# Patient Record
Sex: FEMALE | Race: WHITE | NOT HISPANIC OR LATINO | URBAN - METROPOLITAN AREA
[De-identification: names, ages, dates, MRNs, and addresses within clinical notes are randomized per-mention and may not be internally consistent; named-entity substitution may affect disease eponyms.]

---

## 2018-07-10 ENCOUNTER — OFFICE (OUTPATIENT)
Dept: URBAN - METROPOLITAN AREA CLINIC 19 | Facility: CLINIC | Age: 63
End: 2018-07-10

## 2018-07-10 VITALS
SYSTOLIC BLOOD PRESSURE: 124 MMHG | DIASTOLIC BLOOD PRESSURE: 69 MMHG | HEIGHT: 66 IN | HEART RATE: 74 BPM | WEIGHT: 206 LBS

## 2018-07-10 DIAGNOSIS — K21.9 GASTRO-ESOPHAGEAL REFLUX DISEASE WITHOUT ESOPHAGITIS: ICD-10-CM

## 2018-07-10 DIAGNOSIS — K51.90 ULCERATIVE COLITIS, UNSPECIFIED, WITHOUT COMPLICATIONS: ICD-10-CM

## 2018-07-10 DIAGNOSIS — R19.7 DIARRHEA, UNSPECIFIED: ICD-10-CM

## 2018-07-10 PROCEDURE — 99213 OFFICE O/P EST LOW 20 MIN: CPT | Performed by: INTERNAL MEDICINE

## 2018-07-10 RX ORDER — MESALAMINE 1000 MG/1
1 SUPPOSITORY RECTAL
Qty: 90 | Refills: 3 | Status: COMPLETED
Start: 2018-07-10 | End: 2019-07-15

## 2018-07-10 RX ORDER — PANTOPRAZOLE SODIUM 20 MG/1
20 TABLET, DELAYED RELEASE ORAL
Qty: 30 | Refills: 11 | Status: COMPLETED
Start: 2018-07-10 | End: 2019-07-23 | Stop reason: SDUPTHER

## 2019-07-15 ENCOUNTER — OFFICE (OUTPATIENT)
Dept: URBAN - METROPOLITAN AREA CLINIC 19 | Facility: CLINIC | Age: 64
End: 2019-07-15

## 2019-07-15 VITALS
SYSTOLIC BLOOD PRESSURE: 133 MMHG | HEART RATE: 67 BPM | DIASTOLIC BLOOD PRESSURE: 72 MMHG | HEIGHT: 66 IN | WEIGHT: 203 LBS

## 2019-07-15 DIAGNOSIS — R19.7 DIARRHEA, UNSPECIFIED: ICD-10-CM

## 2019-07-15 DIAGNOSIS — K21.9 GASTRO-ESOPHAGEAL REFLUX DISEASE WITHOUT ESOPHAGITIS: ICD-10-CM

## 2019-07-15 DIAGNOSIS — K51.20 ULCERATIVE (CHRONIC) PROCTITIS WITHOUT COMPLICATIONS: ICD-10-CM

## 2019-07-15 DIAGNOSIS — R11.0 NAUSEA: ICD-10-CM

## 2019-07-15 PROCEDURE — 99213 OFFICE O/P EST LOW 20 MIN: CPT | Performed by: NURSE PRACTITIONER

## 2019-07-15 RX ORDER — MESALAMINE 1000 MG/1
SUPPOSITORY RECTAL
Qty: 30 | Refills: 3 | Status: ACTIVE
Start: 2019-07-15

## 2019-07-15 RX ORDER — LACTOBACIL 2/BIFIDO 1/S.THERMO 450B CELL
PACKET (EA) ORAL
Qty: 30 | Refills: 11 | Status: COMPLETED
Start: 2019-07-15 | End: 2019-07-26

## 2019-07-15 RX ORDER — PANTOPRAZOLE SODIUM 20 MG/1
TABLET, DELAYED RELEASE ORAL
Qty: 30 | Refills: 11 | Status: COMPLETED
Start: 2019-07-15 | End: 2020-03-01

## 2019-07-15 NOTE — SERVICENOTES
The patient's assessment was discussed face to face with Dr. Castellanos and a collaborative plan of care was established. May add EGD to flex/sig

## 2019-07-15 NOTE — SERVICEHPINOTES
Mrs. Chamberlain presents with diarrhea, fever, nausea, and for follow-up on her ulcerative colitis.  After returning home from a trip to [place] on the 4th of July, she began experiencing fever, chills, diarrhea, and nausea.  She had been eating foods that she does not normally eat, and she did also get a tick bite.  Blood work by Dr. Montaño showed a normal CBC and CMP.  Her CRP was slightly elevated at 2.2.  She has had ulcerative colitis for many years.  She has been taking canasa suppositories once a week.  With this acute illness, she denies any hematochezia, tenesmus, or vomiting.  The fevers have now subsided.  She still continues having diarrhea.  She is having 1 or 2 loose stools a day, but she is taking Imodium.  Prior to this acute episode, she reports having diarrhea fairly frequently for the last year and a half.  It is not associated with the dull abdominal pain with her flares of her UC in the past.  It happens if she eats foods such as salads, spicy foods, or many other foods.  She is having to stick to a bland diet. She denies any precipitating factors for when the diarrhea began happening more frequently. Her colonoscopy in 2016 showed ulcerative colitis in the sigmoid colon.  She also complains of reflux.  She is hesitant to be on the higher dose of PPIs.  She is currently taking pantoprazole 20 mg daily, but not on a regular basis.  She is worried about a hiatal hernia because her father has a large one.

## 2019-07-18 ENCOUNTER — OFFICE (OUTPATIENT)
Dept: URBAN - METROPOLITAN AREA CLINIC 19 | Facility: CLINIC | Age: 64
End: 2019-07-18

## 2019-07-18 LAB
GI PROFILE, STOOL, PCR: ADENOVIRUS F 40/41: NOT DETECTED
GI PROFILE, STOOL, PCR: ASTROVIRUS: NOT DETECTED
GI PROFILE, STOOL, PCR: C DIFFICILE TOXIN A/B: DETECTED
GI PROFILE, STOOL, PCR: CAMPYLOBACTER: NOT DETECTED
GI PROFILE, STOOL, PCR: CRYPTOSPORIDIUM: NOT DETECTED
GI PROFILE, STOOL, PCR: CYCLOSPORA CAYETANENSIS: NOT DETECTED
GI PROFILE, STOOL, PCR: E COLI O157: (no result)
GI PROFILE, STOOL, PCR: ENTAMOEBA HISTOLYTICA: NOT DETECTED
GI PROFILE, STOOL, PCR: ENTEROAGGREGATIVE E COLI: NOT DETECTED
GI PROFILE, STOOL, PCR: ENTEROPATHOGENIC E COLI: NOT DETECTED
GI PROFILE, STOOL, PCR: ENTEROTOXIGENIC E COLI: NOT DETECTED
GI PROFILE, STOOL, PCR: GIARDIA LAMBLIA: NOT DETECTED
GI PROFILE, STOOL, PCR: NOROVIRUS GI/GII: NOT DETECTED
GI PROFILE, STOOL, PCR: PLESIOMONAS SHIGELLOIDES: NOT DETECTED
GI PROFILE, STOOL, PCR: ROTAVIRUS A: NOT DETECTED
GI PROFILE, STOOL, PCR: SALMONELLA: NOT DETECTED
GI PROFILE, STOOL, PCR: SAPOVIRUS: NOT DETECTED
GI PROFILE, STOOL, PCR: SHIGA-TOXIN-PRODUCING E COLI: NOT DETECTED
GI PROFILE, STOOL, PCR: SHIGELLA/ENTEROINVASIVE E COLI: NOT DETECTED
GI PROFILE, STOOL, PCR: VIBRIO CHOLERAE: NOT DETECTED
GI PROFILE, STOOL, PCR: VIBRIO: NOT DETECTED
GI PROFILE, STOOL, PCR: YERSINIA ENTEROCOLITICA: NOT DETECTED

## 2019-07-23 ENCOUNTER — OFFICE (OUTPATIENT)
Dept: URBAN - METROPOLITAN AREA CLINIC 19 | Facility: CLINIC | Age: 64
End: 2019-07-23

## 2019-07-23 VITALS
HEIGHT: 66 IN | SYSTOLIC BLOOD PRESSURE: 137 MMHG | WEIGHT: 206 LBS | HEART RATE: 63 BPM | DIASTOLIC BLOOD PRESSURE: 77 MMHG

## 2019-07-23 DIAGNOSIS — K29.80 DUODENITIS WITHOUT BLEEDING: ICD-10-CM

## 2019-07-23 DIAGNOSIS — A04.72 ENTEROCOLITIS DUE TO CLOSTRIDIUM DIFFICILE, NOT SPECIFIED AS: ICD-10-CM

## 2019-07-23 DIAGNOSIS — K51.90 ULCERATIVE COLITIS, UNSPECIFIED, WITHOUT COMPLICATIONS: ICD-10-CM

## 2019-07-23 DIAGNOSIS — K21.9 GASTRO-ESOPHAGEAL REFLUX DISEASE WITHOUT ESOPHAGITIS: ICD-10-CM

## 2019-07-23 PROCEDURE — 99212 OFFICE O/P EST SF 10 MIN: CPT | Performed by: NURSE PRACTITIONER

## 2019-07-26 ENCOUNTER — AMBULATORY SURGICAL CENTER (OUTPATIENT)
Dept: URBAN - METROPOLITAN AREA SURGERY 2 | Facility: SURGERY | Age: 64
End: 2019-07-26

## 2019-07-26 VITALS
HEART RATE: 57 BPM | DIASTOLIC BLOOD PRESSURE: 76 MMHG | DIASTOLIC BLOOD PRESSURE: 76 MMHG | OXYGEN SATURATION: 99 % | DIASTOLIC BLOOD PRESSURE: 72 MMHG | TEMPERATURE: 98.6 F | SYSTOLIC BLOOD PRESSURE: 120 MMHG | OXYGEN SATURATION: 100 % | HEART RATE: 55 BPM | HEART RATE: 57 BPM | RESPIRATION RATE: 16 BRPM | WEIGHT: 203 LBS | SYSTOLIC BLOOD PRESSURE: 114 MMHG | DIASTOLIC BLOOD PRESSURE: 73 MMHG | DIASTOLIC BLOOD PRESSURE: 72 MMHG | HEART RATE: 58 BPM | HEIGHT: 66 IN | RESPIRATION RATE: 16 BRPM | OXYGEN SATURATION: 100 % | HEART RATE: 55 BPM | HEART RATE: 55 BPM | SYSTOLIC BLOOD PRESSURE: 118 MMHG | SYSTOLIC BLOOD PRESSURE: 123 MMHG | TEMPERATURE: 97.8 F | DIASTOLIC BLOOD PRESSURE: 73 MMHG | SYSTOLIC BLOOD PRESSURE: 118 MMHG | TEMPERATURE: 97.8 F | HEART RATE: 58 BPM | SYSTOLIC BLOOD PRESSURE: 123 MMHG | TEMPERATURE: 98.6 F | HEART RATE: 57 BPM | SYSTOLIC BLOOD PRESSURE: 120 MMHG | HEART RATE: 58 BPM | DIASTOLIC BLOOD PRESSURE: 72 MMHG | TEMPERATURE: 98.6 F | DIASTOLIC BLOOD PRESSURE: 76 MMHG | SYSTOLIC BLOOD PRESSURE: 120 MMHG | TEMPERATURE: 97.8 F | SYSTOLIC BLOOD PRESSURE: 118 MMHG | OXYGEN SATURATION: 99 % | HEIGHT: 66 IN | HEIGHT: 66 IN | OXYGEN SATURATION: 97 % | OXYGEN SATURATION: 100 % | SYSTOLIC BLOOD PRESSURE: 123 MMHG | WEIGHT: 203 LBS | OXYGEN SATURATION: 97 % | SYSTOLIC BLOOD PRESSURE: 114 MMHG | SYSTOLIC BLOOD PRESSURE: 114 MMHG | OXYGEN SATURATION: 97 % | DIASTOLIC BLOOD PRESSURE: 73 MMHG | RESPIRATION RATE: 16 BRPM | OXYGEN SATURATION: 99 % | WEIGHT: 203 LBS

## 2019-07-26 DIAGNOSIS — R93.3 ABNORMAL FINDINGS ON DIAGNOSTIC IMAGING OF OTHER PARTS OF DI: ICD-10-CM

## 2019-07-26 PROCEDURE — 43235 EGD DIAGNOSTIC BRUSH WASH: CPT | Performed by: INTERNAL MEDICINE

## 2019-08-05 ENCOUNTER — OFFICE (OUTPATIENT)
Dept: URBAN - METROPOLITAN AREA CLINIC 19 | Facility: CLINIC | Age: 64
End: 2019-08-05

## 2019-09-10 ENCOUNTER — OFFICE (OUTPATIENT)
Dept: URBAN - METROPOLITAN AREA CLINIC 19 | Facility: CLINIC | Age: 64
End: 2019-09-10

## 2019-09-10 VITALS
DIASTOLIC BLOOD PRESSURE: 73 MMHG | HEART RATE: 64 BPM | HEIGHT: 66 IN | WEIGHT: 206 LBS | SYSTOLIC BLOOD PRESSURE: 138 MMHG

## 2019-09-10 DIAGNOSIS — R19.7 DIARRHEA, UNSPECIFIED: ICD-10-CM

## 2019-09-10 DIAGNOSIS — K51.20 ULCERATIVE (CHRONIC) PROCTITIS WITHOUT COMPLICATIONS: ICD-10-CM

## 2019-09-10 PROCEDURE — 99213 OFFICE O/P EST LOW 20 MIN: CPT | Performed by: INTERNAL MEDICINE

## 2019-09-13 ENCOUNTER — AMBULATORY SURGICAL CENTER (OUTPATIENT)
Dept: URBAN - METROPOLITAN AREA SURGERY 2 | Facility: SURGERY | Age: 64
End: 2019-09-13
Payer: COMMERCIAL

## 2019-09-13 ENCOUNTER — OFFICE (OUTPATIENT)
Dept: URBAN - METROPOLITAN AREA PATHOLOGY 22 | Facility: PATHOLOGY | Age: 64
End: 2019-09-13

## 2019-09-13 VITALS
SYSTOLIC BLOOD PRESSURE: 123 MMHG | OXYGEN SATURATION: 97 % | TEMPERATURE: 98 F | OXYGEN SATURATION: 97 % | DIASTOLIC BLOOD PRESSURE: 67 MMHG | HEIGHT: 66 IN | DIASTOLIC BLOOD PRESSURE: 67 MMHG | RESPIRATION RATE: 16 BRPM | HEART RATE: 58 BPM | RESPIRATION RATE: 16 BRPM | HEART RATE: 58 BPM | TEMPERATURE: 98 F | WEIGHT: 206 LBS | WEIGHT: 206 LBS | SYSTOLIC BLOOD PRESSURE: 123 MMHG | HEIGHT: 66 IN

## 2019-09-13 DIAGNOSIS — R19.7 DIARRHEA, UNSPECIFIED: ICD-10-CM

## 2019-09-13 DIAGNOSIS — K57.30 DIVERTICULOSIS OF LARGE INTESTINE WITHOUT PERFORATION OR ABS: ICD-10-CM

## 2019-09-13 LAB
C DIFFICILE TOXIN GENE NAA: NEGATIVE
C DIFFICILE TOXIN GENE NAA: NEGATIVE

## 2019-09-13 PROCEDURE — 88305 TISSUE EXAM BY PATHOLOGIST: CPT | Performed by: INTERNAL MEDICINE

## 2019-09-13 PROCEDURE — 45331 SIGMOIDOSCOPY AND BIOPSY: CPT | Performed by: INTERNAL MEDICINE

## 2020-11-30 ENCOUNTER — OFFICE (OUTPATIENT)
Dept: URBAN - METROPOLITAN AREA CLINIC 19 | Facility: CLINIC | Age: 65
End: 2020-11-30

## 2020-11-30 VITALS
OXYGEN SATURATION: 100 % | HEART RATE: 73 BPM | WEIGHT: 196 LBS | DIASTOLIC BLOOD PRESSURE: 82 MMHG | HEIGHT: 66 IN | SYSTOLIC BLOOD PRESSURE: 150 MMHG

## 2020-11-30 DIAGNOSIS — R19.7 DIARRHEA, UNSPECIFIED: ICD-10-CM

## 2020-11-30 DIAGNOSIS — K57.92 DIVERTICULITIS OF INTESTINE, PART UNSPECIFIED, WITHOUT PERFO: ICD-10-CM

## 2020-11-30 DIAGNOSIS — K86.2 CYST OF PANCREAS: ICD-10-CM

## 2020-11-30 DIAGNOSIS — R10.13 EPIGASTRIC PAIN: ICD-10-CM

## 2020-11-30 LAB
CBC, PLATELET, NO DIFFERENTIAL: HEMATOCRIT: 40.4 % (ref 34–46.6)
CBC, PLATELET, NO DIFFERENTIAL: HEMOGLOBIN: 13.3 G/DL (ref 11.1–15.9)
CBC, PLATELET, NO DIFFERENTIAL: MCH: 29.9 PG (ref 26.6–33)
CBC, PLATELET, NO DIFFERENTIAL: MCHC: 32.9 G/DL (ref 31.5–35.7)
CBC, PLATELET, NO DIFFERENTIAL: MCV: 91 FL (ref 79–97)
CBC, PLATELET, NO DIFFERENTIAL: PLATELETS: 316 X10E3/UL (ref 150–450)
CBC, PLATELET, NO DIFFERENTIAL: RBC: 4.45 X10E6/UL (ref 3.77–5.28)
CBC, PLATELET, NO DIFFERENTIAL: RDW: 12.4 % (ref 11.7–15.4)
CBC, PLATELET, NO DIFFERENTIAL: WBC: 5.9 X10E3/UL (ref 3.4–10.8)
COMP. METABOLIC PANEL (14): A/G RATIO: 2 (ref 1.2–2.2)
COMP. METABOLIC PANEL (14): ALBUMIN: 4.5 G/DL (ref 3.8–4.8)
COMP. METABOLIC PANEL (14): ALKALINE PHOSPHATASE: 89 IU/L (ref 39–117)
COMP. METABOLIC PANEL (14): ALT (SGPT): 51 IU/L — HIGH (ref 0–32)
COMP. METABOLIC PANEL (14): AST (SGOT): 28 IU/L (ref 0–40)
COMP. METABOLIC PANEL (14): BILIRUBIN, TOTAL: 0.5 MG/DL (ref 0–1.2)
COMP. METABOLIC PANEL (14): BUN/CREATININE RATIO: 16 (ref 12–28)
COMP. METABOLIC PANEL (14): BUN: 12 MG/DL (ref 8–27)
COMP. METABOLIC PANEL (14): CALCIUM: 10.5 MG/DL — HIGH (ref 8.7–10.3)
COMP. METABOLIC PANEL (14): CARBON DIOXIDE, TOTAL: 22 MMOL/L (ref 20–29)
COMP. METABOLIC PANEL (14): CHLORIDE: 102 MMOL/L (ref 96–106)
COMP. METABOLIC PANEL (14): CREATININE: 0.73 MG/DL (ref 0.57–1)
COMP. METABOLIC PANEL (14): EGFR IF AFRICN AM: 101 ML/MIN/1.73 (ref 59–?)
COMP. METABOLIC PANEL (14): EGFR IF NONAFRICN AM: 87 ML/MIN/1.73 (ref 59–?)
COMP. METABOLIC PANEL (14): GLOBULIN, TOTAL: 2.2 G/DL (ref 1.5–4.5)
COMP. METABOLIC PANEL (14): GLUCOSE: 120 MG/DL — HIGH (ref 65–99)
COMP. METABOLIC PANEL (14): POTASSIUM: 3.9 MMOL/L (ref 3.5–5.2)
COMP. METABOLIC PANEL (14): PROTEIN, TOTAL: 6.7 G/DL (ref 6–8.5)
COMP. METABOLIC PANEL (14): SODIUM: 140 MMOL/L (ref 134–144)

## 2020-11-30 PROCEDURE — 99213 OFFICE O/P EST LOW 20 MIN: CPT | Performed by: NURSE PRACTITIONER

## 2021-02-08 ENCOUNTER — OFFICE (OUTPATIENT)
Dept: URBAN - METROPOLITAN AREA CLINIC 19 | Facility: CLINIC | Age: 66
End: 2021-02-08
Payer: COMMERCIAL

## 2021-02-08 VITALS
HEART RATE: 63 BPM | WEIGHT: 198 LBS | DIASTOLIC BLOOD PRESSURE: 70 MMHG | SYSTOLIC BLOOD PRESSURE: 126 MMHG | OXYGEN SATURATION: 98 % | HEIGHT: 66 IN

## 2021-02-08 DIAGNOSIS — K59.00 CONSTIPATION, UNSPECIFIED: ICD-10-CM

## 2021-02-08 DIAGNOSIS — K51.20 ULCERATIVE (CHRONIC) PROCTITIS WITHOUT COMPLICATIONS: ICD-10-CM

## 2021-02-08 DIAGNOSIS — K86.2 CYST OF PANCREAS: ICD-10-CM

## 2021-02-08 PROCEDURE — 99213 OFFICE O/P EST LOW 20 MIN: CPT | Performed by: INTERNAL MEDICINE

## 2021-03-05 ENCOUNTER — OFFICE (OUTPATIENT)
Dept: URBAN - METROPOLITAN AREA CLINIC 19 | Facility: CLINIC | Age: 66
End: 2021-03-05

## 2021-03-05 VITALS
HEART RATE: 84 BPM | HEIGHT: 66 IN | WEIGHT: 198 LBS | DIASTOLIC BLOOD PRESSURE: 91 MMHG | SYSTOLIC BLOOD PRESSURE: 147 MMHG | OXYGEN SATURATION: 99 %

## 2021-03-05 DIAGNOSIS — K86.2 CYST OF PANCREAS: ICD-10-CM

## 2021-03-05 PROCEDURE — 99214 OFFICE O/P EST MOD 30 MIN: CPT | Performed by: INTERNAL MEDICINE

## 2021-04-05 ENCOUNTER — ON CAMPUS - OUTPATIENT (OUTPATIENT)
Dept: URBAN - METROPOLITAN AREA HOSPITAL 97 | Facility: HOSPITAL | Age: 66
End: 2021-04-05

## 2021-04-05 DIAGNOSIS — K22.2 ESOPHAGEAL OBSTRUCTION: ICD-10-CM

## 2021-04-05 DIAGNOSIS — K86.2 CYST OF PANCREAS: ICD-10-CM

## 2021-04-05 DIAGNOSIS — K44.9 DIAPHRAGMATIC HERNIA WITHOUT OBSTRUCTION OR GANGRENE: ICD-10-CM

## 2021-04-05 PROCEDURE — 43242 EGD US FINE NEEDLE BX/ASPIR: CPT | Performed by: INTERNAL MEDICINE

## 2021-07-15 ENCOUNTER — OFFICE (OUTPATIENT)
Dept: URBAN - METROPOLITAN AREA CLINIC 19 | Facility: CLINIC | Age: 66
End: 2021-07-15

## 2021-07-15 VITALS
OXYGEN SATURATION: 98 % | WEIGHT: 199 LBS | SYSTOLIC BLOOD PRESSURE: 128 MMHG | DIASTOLIC BLOOD PRESSURE: 81 MMHG | HEART RATE: 62 BPM | HEIGHT: 66 IN

## 2021-07-15 DIAGNOSIS — R19.7 DIARRHEA, UNSPECIFIED: ICD-10-CM

## 2021-07-15 DIAGNOSIS — K51.90 ULCERATIVE COLITIS, UNSPECIFIED, WITHOUT COMPLICATIONS: ICD-10-CM

## 2021-07-15 LAB
CBC, PLATELET, NO DIFFERENTIAL: HEMATOCRIT: 39.9 % (ref 34–46.6)
CBC, PLATELET, NO DIFFERENTIAL: HEMOGLOBIN: 13 G/DL (ref 11.1–15.9)
CBC, PLATELET, NO DIFFERENTIAL: MCH: 30.3 PG (ref 26.6–33)
CBC, PLATELET, NO DIFFERENTIAL: MCHC: 32.6 G/DL (ref 31.5–35.7)
CBC, PLATELET, NO DIFFERENTIAL: MCV: 93 FL (ref 79–97)
CBC, PLATELET, NO DIFFERENTIAL: NRBC: (no result)
CBC, PLATELET, NO DIFFERENTIAL: PLATELETS: 294 X10E3/UL (ref 150–450)
CBC, PLATELET, NO DIFFERENTIAL: RBC: 4.29 X10E6/UL (ref 3.77–5.28)
CBC, PLATELET, NO DIFFERENTIAL: RDW: 12.9 % (ref 11.7–15.4)
CBC, PLATELET, NO DIFFERENTIAL: WBC: 6.9 X10E3/UL (ref 3.4–10.8)
COMP. METABOLIC PANEL (14): A/G RATIO: 2.1 (ref 1.2–2.2)
COMP. METABOLIC PANEL (14): ALBUMIN: 4.6 G/DL (ref 3.8–4.8)
COMP. METABOLIC PANEL (14): ALKALINE PHOSPHATASE: 73 IU/L (ref 48–121)
COMP. METABOLIC PANEL (14): ALT (SGPT): 22 IU/L (ref 0–32)
COMP. METABOLIC PANEL (14): AST (SGOT): 22 IU/L (ref 0–40)
COMP. METABOLIC PANEL (14): BILIRUBIN, TOTAL: 0.6 MG/DL (ref 0–1.2)
COMP. METABOLIC PANEL (14): BUN/CREATININE RATIO: 20 (ref 12–28)
COMP. METABOLIC PANEL (14): BUN: 14 MG/DL (ref 8–27)
COMP. METABOLIC PANEL (14): CALCIUM: 10.1 MG/DL (ref 8.7–10.3)
COMP. METABOLIC PANEL (14): CARBON DIOXIDE, TOTAL: 23 MMOL/L (ref 20–29)
COMP. METABOLIC PANEL (14): CHLORIDE: 106 MMOL/L (ref 96–106)
COMP. METABOLIC PANEL (14): CREATININE: 0.69 MG/DL (ref 0.57–1)
COMP. METABOLIC PANEL (14): EGFR IF AFRICN AM: 106 ML/MIN/1.73 (ref 59–?)
COMP. METABOLIC PANEL (14): EGFR IF NONAFRICN AM: 92 ML/MIN/1.73 (ref 59–?)
COMP. METABOLIC PANEL (14): GLOBULIN, TOTAL: 2.2 G/DL (ref 1.5–4.5)
COMP. METABOLIC PANEL (14): GLUCOSE: 103 MG/DL — HIGH (ref 65–99)
COMP. METABOLIC PANEL (14): POTASSIUM: 4.7 MMOL/L (ref 3.5–5.2)
COMP. METABOLIC PANEL (14): PROTEIN, TOTAL: 6.8 G/DL (ref 6–8.5)
COMP. METABOLIC PANEL (14): SODIUM: 143 MMOL/L (ref 134–144)

## 2021-07-15 PROCEDURE — 99214 OFFICE O/P EST MOD 30 MIN: CPT | Performed by: NURSE PRACTITIONER

## 2021-07-15 RX ORDER — DICYCLOMINE HYDROCHLORIDE 10 MG/1
CAPSULE ORAL
Qty: 30 | Refills: 3 | Status: COMPLETED
Start: 2021-07-15 | End: 2022-05-23

## 2021-07-16 ENCOUNTER — OFFICE (OUTPATIENT)
Dept: URBAN - METROPOLITAN AREA CLINIC 19 | Facility: CLINIC | Age: 66
End: 2021-07-16

## 2021-07-16 LAB
CALPROTECTIN, FECAL: <16 UG/G
GI PROFILE, STOOL, PCR: ADENOVIRUS F 40/41: NOT DETECTED
GI PROFILE, STOOL, PCR: ASTROVIRUS: NOT DETECTED
GI PROFILE, STOOL, PCR: C DIFFICILE TOXIN A/B: NOT DETECTED
GI PROFILE, STOOL, PCR: CAMPYLOBACTER: NOT DETECTED
GI PROFILE, STOOL, PCR: CRYPTOSPORIDIUM: NOT DETECTED
GI PROFILE, STOOL, PCR: CYCLOSPORA CAYETANENSIS: NOT DETECTED
GI PROFILE, STOOL, PCR: E COLI O157: (no result)
GI PROFILE, STOOL, PCR: ENTAMOEBA HISTOLYTICA: NOT DETECTED
GI PROFILE, STOOL, PCR: ENTEROAGGREGATIVE E COLI: NOT DETECTED
GI PROFILE, STOOL, PCR: ENTEROPATHOGENIC E COLI: NOT DETECTED
GI PROFILE, STOOL, PCR: ENTEROTOXIGENIC E COLI: NOT DETECTED
GI PROFILE, STOOL, PCR: GIARDIA LAMBLIA: NOT DETECTED
GI PROFILE, STOOL, PCR: NOROVIRUS GI/GII: NOT DETECTED
GI PROFILE, STOOL, PCR: PLESIOMONAS SHIGELLOIDES: NOT DETECTED
GI PROFILE, STOOL, PCR: ROTAVIRUS A: NOT DETECTED
GI PROFILE, STOOL, PCR: SALMONELLA: NOT DETECTED
GI PROFILE, STOOL, PCR: SAPOVIRUS: NOT DETECTED
GI PROFILE, STOOL, PCR: SHIGA-TOXIN-PRODUCING E COLI: NOT DETECTED
GI PROFILE, STOOL, PCR: SHIGELLA/ENTEROINVASIVE E COLI: NOT DETECTED
GI PROFILE, STOOL, PCR: VIBRIO CHOLERAE: NOT DETECTED
GI PROFILE, STOOL, PCR: VIBRIO: NOT DETECTED
GI PROFILE, STOOL, PCR: YERSINIA ENTEROCOLITICA: NOT DETECTED

## 2021-07-21 ENCOUNTER — OFFICE (OUTPATIENT)
Dept: URBAN - METROPOLITAN AREA CLINIC 19 | Facility: CLINIC | Age: 66
End: 2021-07-21

## 2021-07-21 LAB — TSH: 1.12 UIU/ML (ref 0.45–4.5)

## 2021-08-31 ENCOUNTER — OFFICE (OUTPATIENT)
Dept: URBAN - METROPOLITAN AREA CLINIC 19 | Facility: CLINIC | Age: 66
End: 2021-08-31

## 2021-08-31 VITALS
HEART RATE: 67 BPM | OXYGEN SATURATION: 97 % | WEIGHT: 197 LBS | DIASTOLIC BLOOD PRESSURE: 73 MMHG | HEIGHT: 66 IN | SYSTOLIC BLOOD PRESSURE: 121 MMHG

## 2021-08-31 DIAGNOSIS — R50.9 FEVER, UNSPECIFIED: ICD-10-CM

## 2021-08-31 DIAGNOSIS — R14.0 ABDOMINAL DISTENSION (GASEOUS): ICD-10-CM

## 2021-08-31 DIAGNOSIS — K59.00 CONSTIPATION, UNSPECIFIED: ICD-10-CM

## 2021-08-31 DIAGNOSIS — R10.9 UNSPECIFIED ABDOMINAL PAIN: ICD-10-CM

## 2021-08-31 DIAGNOSIS — R11.0 NAUSEA: ICD-10-CM

## 2021-08-31 LAB
CBC, PLATELET, NO DIFFERENTIAL: HEMATOCRIT: 41.6 % (ref 34–46.6)
CBC, PLATELET, NO DIFFERENTIAL: HEMOGLOBIN: 14.2 G/DL (ref 11.1–15.9)
CBC, PLATELET, NO DIFFERENTIAL: MCH: 30.5 PG (ref 26.6–33)
CBC, PLATELET, NO DIFFERENTIAL: MCHC: 34.1 G/DL (ref 31.5–35.7)
CBC, PLATELET, NO DIFFERENTIAL: MCV: 90 FL (ref 79–97)
CBC, PLATELET, NO DIFFERENTIAL: PLATELETS: 217 X10E3/UL (ref 150–450)
CBC, PLATELET, NO DIFFERENTIAL: RBC: 4.65 X10E6/UL (ref 3.77–5.28)
CBC, PLATELET, NO DIFFERENTIAL: RDW: 12.8 % (ref 11.7–15.4)
CBC, PLATELET, NO DIFFERENTIAL: WBC: 4.2 X10E3/UL (ref 3.4–10.8)
COMP. METABOLIC PANEL (14): A/G RATIO: 1.6 (ref 1.2–2.2)
COMP. METABOLIC PANEL (14): ALBUMIN: 4.2 G/DL (ref 3.8–4.8)
COMP. METABOLIC PANEL (14): ALKALINE PHOSPHATASE: 191 IU/L — HIGH (ref 48–121)
COMP. METABOLIC PANEL (14): ALT (SGPT): 1404 IU/L — CRITICAL HIGH (ref 0–32)
COMP. METABOLIC PANEL (14): AST (SGOT): 1169 IU/L — CRITICAL HIGH (ref 0–40)
COMP. METABOLIC PANEL (14): BILIRUBIN, TOTAL: 0.9 MG/DL (ref 0–1.2)
COMP. METABOLIC PANEL (14): BUN/CREATININE RATIO: 13 (ref 12–28)
COMP. METABOLIC PANEL (14): BUN: 9 MG/DL (ref 8–27)
COMP. METABOLIC PANEL (14): CALCIUM: 10.2 MG/DL (ref 8.7–10.3)
COMP. METABOLIC PANEL (14): CARBON DIOXIDE, TOTAL: 21 MMOL/L (ref 20–29)
COMP. METABOLIC PANEL (14): CHLORIDE: 103 MMOL/L (ref 96–106)
COMP. METABOLIC PANEL (14): CREATININE: 0.71 MG/DL (ref 0.57–1)
COMP. METABOLIC PANEL (14): EGFR IF AFRICN AM: 103 ML/MIN/1.73 (ref 59–?)
COMP. METABOLIC PANEL (14): EGFR IF NONAFRICN AM: 90 ML/MIN/1.73 (ref 59–?)
COMP. METABOLIC PANEL (14): GLOBULIN, TOTAL: 2.6 G/DL (ref 1.5–4.5)
COMP. METABOLIC PANEL (14): GLUCOSE: 109 MG/DL — HIGH (ref 65–99)
COMP. METABOLIC PANEL (14): POTASSIUM: 4.2 MMOL/L (ref 3.5–5.2)
COMP. METABOLIC PANEL (14): PROTEIN, TOTAL: 6.8 G/DL (ref 6–8.5)
COMP. METABOLIC PANEL (14): SODIUM: 137 MMOL/L (ref 134–144)

## 2021-08-31 PROCEDURE — 99214 OFFICE O/P EST MOD 30 MIN: CPT | Performed by: NURSE PRACTITIONER

## 2021-09-03 ENCOUNTER — OFFICE (OUTPATIENT)
Dept: URBAN - METROPOLITAN AREA CLINIC 19 | Facility: CLINIC | Age: 66
End: 2021-09-03

## 2021-09-09 ENCOUNTER — OFFICE (OUTPATIENT)
Dept: URBAN - METROPOLITAN AREA CLINIC 19 | Facility: CLINIC | Age: 66
End: 2021-09-09

## 2021-10-01 ENCOUNTER — OFFICE (OUTPATIENT)
Dept: URBAN - METROPOLITAN AREA CLINIC 19 | Facility: CLINIC | Age: 66
End: 2021-10-01

## 2022-05-23 ENCOUNTER — OFFICE (OUTPATIENT)
Dept: URBAN - METROPOLITAN AREA CLINIC 19 | Facility: CLINIC | Age: 67
End: 2022-05-23
Payer: COMMERCIAL

## 2022-05-23 VITALS
OXYGEN SATURATION: 98 % | SYSTOLIC BLOOD PRESSURE: 133 MMHG | WEIGHT: 205 LBS | HEART RATE: 63 BPM | HEIGHT: 66 IN | DIASTOLIC BLOOD PRESSURE: 78 MMHG

## 2022-05-23 DIAGNOSIS — K51.90 ULCERATIVE COLITIS, UNSPECIFIED, WITHOUT COMPLICATIONS: ICD-10-CM

## 2022-05-23 PROCEDURE — 99213 OFFICE O/P EST LOW 20 MIN: CPT | Performed by: INTERNAL MEDICINE

## 2022-07-01 ENCOUNTER — OFFICE (OUTPATIENT)
Dept: URBAN - METROPOLITAN AREA CLINIC 19 | Facility: CLINIC | Age: 67
End: 2022-07-01
Payer: MEDICARE

## 2022-07-01 VITALS — HEIGHT: 66 IN

## 2022-07-01 DIAGNOSIS — Z23 ENCOUNTER FOR IMMUNIZATION: ICD-10-CM

## 2022-07-01 PROCEDURE — 90739 HEPB VACC 2/4 DOSE ADULT IM: CPT | Performed by: INTERNAL MEDICINE

## 2022-07-01 PROCEDURE — G0010 ADMIN HEPATITIS B VACCINE: HCPCS | Performed by: INTERNAL MEDICINE

## 2023-04-10 ENCOUNTER — OFFICE (OUTPATIENT)
Dept: URBAN - METROPOLITAN AREA CLINIC 19 | Facility: CLINIC | Age: 68
End: 2023-04-10

## 2023-04-10 VITALS
WEIGHT: 183 LBS | DIASTOLIC BLOOD PRESSURE: 90 MMHG | SYSTOLIC BLOOD PRESSURE: 147 MMHG | HEIGHT: 66 IN | HEART RATE: 66 BPM | OXYGEN SATURATION: 99 %

## 2023-04-10 DIAGNOSIS — K59.00 CONSTIPATION, UNSPECIFIED: ICD-10-CM

## 2023-04-10 DIAGNOSIS — K86.2 CYST OF PANCREAS: ICD-10-CM

## 2023-04-10 DIAGNOSIS — K21.9 GASTRO-ESOPHAGEAL REFLUX DISEASE WITHOUT ESOPHAGITIS: ICD-10-CM

## 2023-04-10 DIAGNOSIS — K51.20 ULCERATIVE (CHRONIC) PROCTITIS WITHOUT COMPLICATIONS: ICD-10-CM

## 2023-04-10 PROCEDURE — 99214 OFFICE O/P EST MOD 30 MIN: CPT | Performed by: NURSE PRACTITIONER

## 2023-04-10 RX ORDER — MESALAMINE 1000 MG/1
SUPPOSITORY RECTAL
Qty: 30 | Refills: 3 | Status: ACTIVE
Start: 2019-07-15

## 2024-01-31 ENCOUNTER — OFFICE (OUTPATIENT)
Dept: URBAN - METROPOLITAN AREA CLINIC 19 | Facility: CLINIC | Age: 69
End: 2024-01-31

## 2024-01-31 VITALS
DIASTOLIC BLOOD PRESSURE: 82 MMHG | WEIGHT: 186 LBS | HEART RATE: 76 BPM | SYSTOLIC BLOOD PRESSURE: 145 MMHG | HEIGHT: 66 IN | OXYGEN SATURATION: 97 %

## 2024-01-31 DIAGNOSIS — K51.90 ULCERATIVE COLITIS, UNSPECIFIED, WITHOUT COMPLICATIONS: ICD-10-CM

## 2024-01-31 PROCEDURE — 99214 OFFICE O/P EST MOD 30 MIN: CPT | Performed by: NURSE PRACTITIONER

## 2024-01-31 RX ORDER — MESALAMINE 1000 MG/1
SUPPOSITORY RECTAL
Qty: 90 | Refills: 3 | Status: ACTIVE

## 2024-01-31 RX ORDER — VANCOMYCIN HYDROCHLORIDE 125 MG/1
250 CAPSULE ORAL
Qty: 28 | Refills: 0 | Status: COMPLETED
End: 2024-04-10

## 2024-02-05 LAB
C DIFFICILE TOXIN GENE NAA: NEGATIVE
CALPROTECTIN, FECAL: 16 UG/G (ref 0–120)

## 2024-04-10 ENCOUNTER — OFFICE (OUTPATIENT)
Dept: URBAN - METROPOLITAN AREA CLINIC 19 | Facility: CLINIC | Age: 69
End: 2024-04-10
Payer: COMMERCIAL

## 2024-04-10 VITALS
WEIGHT: 184 LBS | HEART RATE: 68 BPM | OXYGEN SATURATION: 96 % | DIASTOLIC BLOOD PRESSURE: 83 MMHG | HEIGHT: 66 IN | SYSTOLIC BLOOD PRESSURE: 137 MMHG

## 2024-04-10 DIAGNOSIS — K51.20 ULCERATIVE (CHRONIC) PROCTITIS WITHOUT COMPLICATIONS: ICD-10-CM

## 2024-04-10 PROCEDURE — 99213 OFFICE O/P EST LOW 20 MIN: CPT | Performed by: INTERNAL MEDICINE

## 2024-07-01 ENCOUNTER — OFFICE (OUTPATIENT)
Dept: URBAN - METROPOLITAN AREA CLINIC 19 | Facility: CLINIC | Age: 69
End: 2024-07-01

## 2024-07-01 VITALS
OXYGEN SATURATION: 97 % | WEIGHT: 187 LBS | SYSTOLIC BLOOD PRESSURE: 151 MMHG | DIASTOLIC BLOOD PRESSURE: 96 MMHG | HEIGHT: 66 IN | SYSTOLIC BLOOD PRESSURE: 180 MMHG | DIASTOLIC BLOOD PRESSURE: 106 MMHG | HEART RATE: 64 BPM

## 2024-07-01 DIAGNOSIS — R74.8 ABNORMAL LEVELS OF OTHER SERUM ENZYMES: ICD-10-CM

## 2024-07-01 DIAGNOSIS — K21.9 GASTRO-ESOPHAGEAL REFLUX DISEASE WITHOUT ESOPHAGITIS: ICD-10-CM

## 2024-07-01 DIAGNOSIS — K51.20 ULCERATIVE (CHRONIC) PROCTITIS WITHOUT COMPLICATIONS: ICD-10-CM

## 2024-07-01 PROCEDURE — 99214 OFFICE O/P EST MOD 30 MIN: CPT | Performed by: NURSE PRACTITIONER

## 2024-07-01 RX ORDER — MESALAMINE 1000 MG/1
SUPPOSITORY RECTAL
Qty: 90 | Refills: 3 | Status: ACTIVE

## 2025-04-17 ENCOUNTER — OFFICE (OUTPATIENT)
Dept: URBAN - METROPOLITAN AREA CLINIC 19 | Facility: CLINIC | Age: 70
End: 2025-04-17
Payer: MEDICARE

## 2025-04-17 VITALS
DIASTOLIC BLOOD PRESSURE: 84 MMHG | OXYGEN SATURATION: 98 % | HEART RATE: 74 BPM | HEIGHT: 66 IN | SYSTOLIC BLOOD PRESSURE: 138 MMHG | WEIGHT: 191 LBS

## 2025-04-17 DIAGNOSIS — K51.90 ULCERATIVE COLITIS, UNSPECIFIED, WITHOUT COMPLICATIONS: ICD-10-CM

## 2025-04-17 PROCEDURE — 99213 OFFICE O/P EST LOW 20 MIN: CPT | Performed by: INTERNAL MEDICINE
